# Patient Record
Sex: FEMALE | Race: BLACK OR AFRICAN AMERICAN | NOT HISPANIC OR LATINO | URBAN - METROPOLITAN AREA
[De-identification: names, ages, dates, MRNs, and addresses within clinical notes are randomized per-mention and may not be internally consistent; named-entity substitution may affect disease eponyms.]

---

## 2021-10-26 ENCOUNTER — OUTPATIENT (OUTPATIENT)
Dept: OUTPATIENT SERVICES | Facility: HOSPITAL | Age: 66
LOS: 1 days | End: 2021-10-26
Payer: MEDICARE

## 2021-10-26 VITALS
HEART RATE: 71 BPM | SYSTOLIC BLOOD PRESSURE: 122 MMHG | WEIGHT: 225.97 LBS | RESPIRATION RATE: 16 BRPM | DIASTOLIC BLOOD PRESSURE: 66 MMHG | OXYGEN SATURATION: 99 % | HEIGHT: 64.5 IN | TEMPERATURE: 97 F

## 2021-10-26 DIAGNOSIS — N84.0 POLYP OF CORPUS UTERI: Chronic | ICD-10-CM

## 2021-10-26 DIAGNOSIS — Z13.810 ENCOUNTER FOR SCREENING FOR UPPER GASTROINTESTINAL DISORDER: ICD-10-CM

## 2021-10-26 DIAGNOSIS — T78.40XA ALLERGY, UNSPECIFIED, INITIAL ENCOUNTER: ICD-10-CM

## 2021-10-26 DIAGNOSIS — I10 ESSENTIAL (PRIMARY) HYPERTENSION: ICD-10-CM

## 2021-10-26 LAB
ANION GAP SERPL CALC-SCNC: 10 MMOL/L — SIGNIFICANT CHANGE UP (ref 7–14)
BUN SERPL-MCNC: 17 MG/DL — SIGNIFICANT CHANGE UP (ref 7–23)
CALCIUM SERPL-MCNC: 10.3 MG/DL — SIGNIFICANT CHANGE UP (ref 8.4–10.5)
CHLORIDE SERPL-SCNC: 105 MMOL/L — SIGNIFICANT CHANGE UP (ref 98–107)
CO2 SERPL-SCNC: 24 MMOL/L — SIGNIFICANT CHANGE UP (ref 22–31)
CREAT SERPL-MCNC: 0.82 MG/DL — SIGNIFICANT CHANGE UP (ref 0.5–1.3)
GLUCOSE SERPL-MCNC: 107 MG/DL — HIGH (ref 70–99)
HCT VFR BLD CALC: 41.5 % — SIGNIFICANT CHANGE UP (ref 34.5–45)
HGB BLD-MCNC: 12.7 G/DL — SIGNIFICANT CHANGE UP (ref 11.5–15.5)
MCHC RBC-ENTMCNC: 27 PG — SIGNIFICANT CHANGE UP (ref 27–34)
MCHC RBC-ENTMCNC: 30.6 GM/DL — LOW (ref 32–36)
MCV RBC AUTO: 88.1 FL — SIGNIFICANT CHANGE UP (ref 80–100)
NRBC # BLD: 0 /100 WBCS — SIGNIFICANT CHANGE UP
NRBC # FLD: 0 K/UL — SIGNIFICANT CHANGE UP
PLATELET # BLD AUTO: 197 K/UL — SIGNIFICANT CHANGE UP (ref 150–400)
POTASSIUM SERPL-MCNC: 4.1 MMOL/L — SIGNIFICANT CHANGE UP (ref 3.5–5.3)
POTASSIUM SERPL-SCNC: 4.1 MMOL/L — SIGNIFICANT CHANGE UP (ref 3.5–5.3)
RBC # BLD: 4.71 M/UL — SIGNIFICANT CHANGE UP (ref 3.8–5.2)
RBC # FLD: 13.7 % — SIGNIFICANT CHANGE UP (ref 10.3–14.5)
SODIUM SERPL-SCNC: 139 MMOL/L — SIGNIFICANT CHANGE UP (ref 135–145)
WBC # BLD: 4.61 K/UL — SIGNIFICANT CHANGE UP (ref 3.8–10.5)
WBC # FLD AUTO: 4.61 K/UL — SIGNIFICANT CHANGE UP (ref 3.8–10.5)

## 2021-10-26 PROCEDURE — 93010 ELECTROCARDIOGRAM REPORT: CPT

## 2021-10-26 NOTE — H&P PST ADULT - NEGATIVE GASTROINTESTINAL SYMPTOMS
pre op dx: encounter screen upper gastrointestinal disorder/no nausea/no vomiting/no constipation/no abdominal pain

## 2021-10-26 NOTE — H&P PST ADULT - GASTROINTESTINAL DETAILS
pre op dx: encounter screen upper gastrointestinal disorder/soft/nontender/no distention/bowel sounds normal

## 2021-10-26 NOTE — H&P PST ADULT - PROBLEM SELECTOR PLAN 2
Patient instructed to take metoprolol with a sip of water on the morning of procedure.   Recent echo and stress results requested

## 2021-10-26 NOTE — H&P PST ADULT - HISTORY OF PRESENT ILLNESS
66 year old female with PMH of HTN  presents to Presurgical testing with diagnosis of encounter screen upper gastrointestinal disorder scheduled for esophagogastroduodenoscopy.

## 2021-10-26 NOTE — H&P PST ADULT - NSICDXFAMILYHX_GEN_ALL_CORE_FT
FAMILY HISTORY:  Mother  Still living? Unknown  Family history of colon cancer in mother, Age at diagnosis: Age Unknown

## 2021-10-26 NOTE — H&P PST ADULT - PROBLEM SELECTOR PLAN 1
Patient tentatively scheduled for  esophagogastroduodenoscopy on 10/29/21.  Pre-op instructions provided. Pt given verbal and written instructions with teach back on  pepcid. Pt verbalized understanding with return demonstration.   Covid testing scheduled prior to surgery as per patient.  NICA precautions,

## 2021-10-26 NOTE — H&P PST ADULT - NSANTHOSAYNRD_GEN_A_CORE
No. NICA screening performed.  STOP BANG Legend: 0-2 = LOW Risk; 3-4 = INTERMEDIATE Risk; 5-8 = HIGH Risk

## 2021-10-29 ENCOUNTER — RESULT REVIEW (OUTPATIENT)
Age: 66
End: 2021-10-29

## 2021-10-29 ENCOUNTER — OUTPATIENT (OUTPATIENT)
Dept: OUTPATIENT SERVICES | Facility: HOSPITAL | Age: 66
LOS: 1 days | Discharge: HOME | End: 2021-10-29
Payer: MEDICARE

## 2021-10-29 VITALS
OXYGEN SATURATION: 99 % | WEIGHT: 218.92 LBS | DIASTOLIC BLOOD PRESSURE: 69 MMHG | SYSTOLIC BLOOD PRESSURE: 123 MMHG | TEMPERATURE: 96 F | HEART RATE: 65 BPM | HEIGHT: 66 IN | RESPIRATION RATE: 16 BRPM

## 2021-10-29 VITALS
DIASTOLIC BLOOD PRESSURE: 55 MMHG | SYSTOLIC BLOOD PRESSURE: 11 MMHG | RESPIRATION RATE: 18 BRPM | HEART RATE: 54 BPM | OXYGEN SATURATION: 100 %

## 2021-10-29 DIAGNOSIS — N84.0 POLYP OF CORPUS UTERI: Chronic | ICD-10-CM

## 2021-10-29 DIAGNOSIS — Z13.810 ENCOUNTER FOR SCREENING FOR UPPER GASTROINTESTINAL DISORDER: ICD-10-CM

## 2021-10-29 PROCEDURE — 88305 TISSUE EXAM BY PATHOLOGIST: CPT | Mod: 26

## 2021-11-01 LAB — SURGICAL PATHOLOGY STUDY: SIGNIFICANT CHANGE UP

## 2024-09-03 PROBLEM — I10 ESSENTIAL (PRIMARY) HYPERTENSION: Chronic | Status: ACTIVE | Noted: 2021-10-26

## 2024-09-11 ENCOUNTER — APPOINTMENT (OUTPATIENT)
Dept: DERMATOLOGY | Facility: CLINIC | Age: 69
End: 2024-09-11
Payer: MEDICARE

## 2024-09-11 VITALS — HEIGHT: 65 IN | WEIGHT: 216 LBS | BODY MASS INDEX: 35.99 KG/M2

## 2024-09-11 DIAGNOSIS — R21 RASH AND OTHER NONSPECIFIC SKIN ERUPTION: ICD-10-CM

## 2024-09-11 PROCEDURE — 11104 PUNCH BX SKIN SINGLE LESION: CPT

## 2024-09-11 PROCEDURE — 99204 OFFICE O/P NEW MOD 45 MIN: CPT | Mod: 25

## 2024-09-11 RX ORDER — TRIAMCINOLONE ACETONIDE 0.25 MG/G
0.03 OINTMENT TOPICAL
Qty: 30 | Refills: 0 | Status: ACTIVE | COMMUNITY
Start: 2024-09-11 | End: 1900-01-01

## 2024-09-11 RX ORDER — BETAMETHASONE DIPROPIONATE 0.5 MG/G
0.05 OINTMENT TOPICAL
Qty: 1 | Refills: 0 | Status: ACTIVE | COMMUNITY
Start: 2024-09-11 | End: 1900-01-01

## 2024-09-11 RX ORDER — AMLODIPINE BESYLATE 5 MG/1
TABLET ORAL
Refills: 0 | Status: ACTIVE | COMMUNITY

## 2024-09-11 NOTE — HISTORY OF PRESENT ILLNESS
[FreeTextEntry1] : rash all over body [de-identified] : Rash all over x months had bx x2 in 3/2024 that showed (A) back - slight sponge (B) lower back - psoriasiform sponge, traumatized  pt has had ointments and injections (?ILK) wiuthout imp. currently no tx

## 2024-09-11 NOTE — PHYSICAL EXAM
[FreeTextEntry3] :  Gen:                        Well appearing, well nourished, NAD. Skin:                        Eccrine:                 Within normal limits   Hair:                         Scalp:                      Face:                       Neck:                           Back:                       UE:                          LE:                           Buttocks:                  Neuro:                     No focal deficits. Age appropriate. Psych:                     Appropriate affect.

## 2024-09-11 NOTE — ASSESSMENT
[FreeTextEntry1] : Rash "all over" x months - trunk, extremtiies, scalp, superior forehead. s/p ointments and injections without imp Bx x2 in 3/2024 that showed (A) back - slight sponge (B) lower back - psoriasiform sponge, traumatized  violaceous papulosquamous plaques - ddx favor lichen planus, also consider psoriasis SLE. pt denies any h/o CTD plan for punch biopsy today plan to check NANY  start beta dip to AA body scalp Proper use and SE meds disc TAC 0.025 AA face. do not apply to normal skin

## 2024-09-12 LAB — ANA SER IF-ACNC: NEGATIVE

## 2024-09-20 LAB — CORE LAB BIOPSY: NORMAL

## 2024-09-23 ENCOUNTER — NON-APPOINTMENT (OUTPATIENT)
Age: 69
End: 2024-09-23

## 2024-09-25 ENCOUNTER — APPOINTMENT (OUTPATIENT)
Dept: DERMATOLOGY | Facility: CLINIC | Age: 69
End: 2024-09-25
Payer: MEDICARE

## 2024-09-25 VITALS — BODY MASS INDEX: 35.99 KG/M2 | WEIGHT: 216 LBS | HEIGHT: 65 IN

## 2024-09-25 DIAGNOSIS — L40.9 PSORIASIS, UNSPECIFIED: ICD-10-CM

## 2024-09-25 PROCEDURE — 99214 OFFICE O/P EST MOD 30 MIN: CPT

## 2024-09-25 RX ORDER — CLOBETASOL PROPIONATE 0.05 G/ML
0.05 SPRAY TOPICAL
Qty: 1 | Refills: 2 | Status: ACTIVE | COMMUNITY
Start: 2024-09-25 | End: 1900-01-01

## 2024-09-25 NOTE — HISTORY OF PRESENT ILLNESS
[FreeTextEntry1] : psoriasis [de-identified] : Pt with rash x months. initial bx 3/2024 showed (A) slight sponge, (B) psoriasiform sponge rash had become more prominent since then, failed multiple topical treatment and ILK biopsy done by me LOV showed:  Final Diagnosis Left posterior thigh,   punch biopsy      - Consistent with psoriasis

## 2024-09-25 NOTE — ASSESSMENT
[FreeTextEntry1] : #Psorasis -confirmed on 9/2024 biopsy from L posterior thigh. suture no longer in place - fell out -dx, nature, mgmt options discussed. print out of bx report provided -pt has failed high, med and low potency topical steroids -pt has failed ILK in past -pt unable to come for phototherapy -pt declines injectable biologic d/t needle phobia  Plan -stop TAC - doesnt help -OK to cont cetaphil cream (states helps little) -start beta diprop for thick lesions on the body. Proper use and SE meds disc -clobetasol liquid - apply to affected areas of the sclap 1-2 times daily until improved -pt declines biologic due to needle fears not amenable at this time -otezla - starter pack then 30mg BID. Proper use and SE meds disc. normal Cr recently in HIE. pt denies h/o depression

## 2024-09-25 NOTE — PHYSICAL EXAM
[FreeTextEntry3] :  Gen:                        Well appearing, well nourished, NAD. Skin:                        Eccrine:                 Within normal limits   Hair:                                      Face:                       Neck:                           Back:                       UE:                          LE:                                     Neuro:                     No focal deficits. Age appropriate. Psych:                     Appropriate affect.

## 2024-11-27 ENCOUNTER — APPOINTMENT (OUTPATIENT)
Dept: DERMATOLOGY | Facility: CLINIC | Age: 69
End: 2024-11-27
Payer: MEDICARE

## 2024-11-27 VITALS — WEIGHT: 216 LBS | BODY MASS INDEX: 35.99 KG/M2 | HEIGHT: 65 IN

## 2024-11-27 DIAGNOSIS — L40.9 PSORIASIS, UNSPECIFIED: ICD-10-CM

## 2024-11-27 PROCEDURE — 99215 OFFICE O/P EST HI 40 MIN: CPT

## 2024-11-27 RX ORDER — GUSELKUMAB 100 MG/ML
100 INJECTION SUBCUTANEOUS
Qty: 1 | Refills: 5 | Status: ACTIVE | COMMUNITY
Start: 2024-11-27 | End: 1900-01-01

## 2024-11-27 RX ORDER — TRIAMCINOLONE ACETONIDE 1 MG/G
0.1 OINTMENT TOPICAL
Qty: 1 | Refills: 1 | Status: ACTIVE | COMMUNITY
Start: 2024-11-27 | End: 1900-01-01

## 2024-11-29 LAB
HBV CORE IGG+IGM SER QL: NONREACTIVE
HBV SURFACE AB SER QL: NONREACTIVE
HBV SURFACE AG SER QL: NONREACTIVE
HCV AB SER QL: NONREACTIVE
HCV S/CO RATIO: 0.08 S/CO

## 2024-12-03 LAB
M TB IFN-G BLD-IMP: POSITIVE
QUANTIFERON TB PLUS MITOGEN MINUS NIL: >10 IU/ML
QUANTIFERON TB PLUS NIL: 0.04 IU/ML
QUANTIFERON TB PLUS TB1 MINUS NIL: 1.72 IU/ML
QUANTIFERON TB PLUS TB2 MINUS NIL: 1.93 IU/ML

## 2024-12-30 ENCOUNTER — APPOINTMENT (OUTPATIENT)
Dept: DERMATOLOGY | Facility: CLINIC | Age: 69
End: 2024-12-30

## 2024-12-31 DIAGNOSIS — R76.12 NONSPECIFIC REACTION TO CELL MEDIATED IMMUNITY MEASUREMENT OF GAMMA INTERFERON ANTIGEN RESPONSE W/OUT ACTIVE TUBERCULOSIS: ICD-10-CM

## 2025-01-07 ENCOUNTER — APPOINTMENT (OUTPATIENT)
Dept: DERMATOLOGY | Facility: CLINIC | Age: 70
End: 2025-01-07
Payer: MEDICARE

## 2025-01-07 ENCOUNTER — NON-APPOINTMENT (OUTPATIENT)
Age: 70
End: 2025-01-07

## 2025-01-07 VITALS — HEIGHT: 65 IN | BODY MASS INDEX: 35.99 KG/M2 | WEIGHT: 216 LBS

## 2025-01-07 DIAGNOSIS — L40.9 PSORIASIS, UNSPECIFIED: ICD-10-CM

## 2025-01-07 PROCEDURE — 99215 OFFICE O/P EST HI 40 MIN: CPT

## 2025-01-07 RX ORDER — ACITRETIN 25 MG/1
25 CAPSULE ORAL DAILY
Qty: 30 | Refills: 2 | Status: DISCONTINUED | COMMUNITY
Start: 2025-01-02 | End: 2025-01-07

## 2025-01-07 RX ORDER — CLOBETASOL PROPIONATE 0.5 MG/G
0.05 OINTMENT TOPICAL
Qty: 1 | Refills: 2 | Status: ACTIVE | COMMUNITY
Start: 2025-01-07 | End: 1900-01-01

## 2025-01-21 ENCOUNTER — APPOINTMENT (OUTPATIENT)
Dept: RADIOLOGY | Facility: IMAGING CENTER | Age: 70
End: 2025-01-21
Payer: MEDICARE

## 2025-01-21 ENCOUNTER — OUTPATIENT (OUTPATIENT)
Dept: OUTPATIENT SERVICES | Facility: HOSPITAL | Age: 70
LOS: 1 days | End: 2025-01-21
Payer: MEDICARE

## 2025-01-21 DIAGNOSIS — Z00.8 ENCOUNTER FOR OTHER GENERAL EXAMINATION: ICD-10-CM

## 2025-01-21 PROCEDURE — 71046 X-RAY EXAM CHEST 2 VIEWS: CPT

## 2025-01-21 PROCEDURE — 71046 X-RAY EXAM CHEST 2 VIEWS: CPT | Mod: 26

## 2025-01-29 ENCOUNTER — APPOINTMENT (OUTPATIENT)
Dept: ULTRASOUND IMAGING | Facility: IMAGING CENTER | Age: 70
End: 2025-01-29
Payer: MEDICARE

## 2025-01-29 ENCOUNTER — OUTPATIENT (OUTPATIENT)
Dept: OUTPATIENT SERVICES | Facility: HOSPITAL | Age: 70
LOS: 1 days | End: 2025-01-29
Payer: MEDICARE

## 2025-01-29 DIAGNOSIS — N84.0 POLYP OF CORPUS UTERI: Chronic | ICD-10-CM

## 2025-01-29 DIAGNOSIS — Z00.8 ENCOUNTER FOR OTHER GENERAL EXAMINATION: ICD-10-CM

## 2025-01-29 PROCEDURE — 76536 US EXAM OF HEAD AND NECK: CPT

## 2025-01-29 PROCEDURE — 76536 US EXAM OF HEAD AND NECK: CPT | Mod: 26

## 2025-04-03 ENCOUNTER — APPOINTMENT (OUTPATIENT)
Dept: SURGERY | Facility: CLINIC | Age: 70
End: 2025-04-03
Payer: MEDICARE

## 2025-04-03 VITALS
DIASTOLIC BLOOD PRESSURE: 77 MMHG | WEIGHT: 221 LBS | SYSTOLIC BLOOD PRESSURE: 130 MMHG | HEIGHT: 66 IN | HEART RATE: 76 BPM | BODY MASS INDEX: 35.52 KG/M2

## 2025-04-03 DIAGNOSIS — E04.1 NONTOXIC SINGLE THYROID NODULE: ICD-10-CM

## 2025-04-03 DIAGNOSIS — Z80.0 FAMILY HISTORY OF MALIGNANT NEOPLASM OF DIGESTIVE ORGANS: ICD-10-CM

## 2025-04-03 DIAGNOSIS — E55.9 VITAMIN D DEFICIENCY, UNSPECIFIED: ICD-10-CM

## 2025-04-03 PROCEDURE — 36415 COLL VENOUS BLD VENIPUNCTURE: CPT

## 2025-04-03 PROCEDURE — 99204 OFFICE O/P NEW MOD 45 MIN: CPT | Mod: 25

## 2025-04-04 LAB
25(OH)D3 SERPL-MCNC: 12.6 NG/ML
CALCIUM SERPL-MCNC: 10.4 MG/DL
CALCIUM SERPL-MCNC: 10.4 MG/DL
PARATHYROID HORMONE INTACT: 61 PG/ML
T3 SERPL-MCNC: 98 NG/DL
T4 FREE SERPL-MCNC: 1.1 NG/DL
THYROGLOB AB SERPL-ACNC: 15.1 IU/ML
THYROPEROXIDASE AB SERPL IA-ACNC: 19.5 IU/ML
TSH SERPL-ACNC: 1.23 UIU/ML

## 2025-04-09 DIAGNOSIS — E83.52 HYPERCALCEMIA: ICD-10-CM

## 2025-04-09 LAB
CAU: 25 MG/DL
CREAT 24H UR-MCNC: 1.6 G/24 H
CREAT ?TM UR-MCNC: 142 MG/DL
PROT ?TM UR-MCNC: 24 HR
SPECIMEN VOL 24H UR: 1100 ML
SPECIMEN VOL 24H UR: 275 MG/24 H

## 2025-05-21 ENCOUNTER — OUTPATIENT (OUTPATIENT)
Dept: OUTPATIENT SERVICES | Facility: HOSPITAL | Age: 70
LOS: 1 days | End: 2025-05-21
Payer: MEDICARE

## 2025-05-21 ENCOUNTER — APPOINTMENT (OUTPATIENT)
Dept: MRI IMAGING | Facility: IMAGING CENTER | Age: 70
End: 2025-05-21

## 2025-05-21 DIAGNOSIS — N84.0 POLYP OF CORPUS UTERI: Chronic | ICD-10-CM

## 2025-05-21 DIAGNOSIS — Z00.8 ENCOUNTER FOR OTHER GENERAL EXAMINATION: ICD-10-CM

## 2025-05-21 PROCEDURE — 70543 MRI ORBT/FAC/NCK W/O &W/DYE: CPT

## 2025-05-21 PROCEDURE — 70543 MRI ORBT/FAC/NCK W/O &W/DYE: CPT | Mod: 26

## 2025-05-21 PROCEDURE — A9585: CPT

## 2025-06-11 ENCOUNTER — APPOINTMENT (OUTPATIENT)
Dept: SURGICAL ONCOLOGY | Facility: CLINIC | Age: 70
End: 2025-06-11
Payer: MEDICARE

## 2025-06-11 VITALS
HEART RATE: 80 BPM | WEIGHT: 221 LBS | OXYGEN SATURATION: 97 % | BODY MASS INDEX: 35.52 KG/M2 | DIASTOLIC BLOOD PRESSURE: 80 MMHG | SYSTOLIC BLOOD PRESSURE: 150 MMHG | HEIGHT: 66 IN

## 2025-06-11 PROCEDURE — 99215 OFFICE O/P EST HI 40 MIN: CPT

## 2025-06-16 ENCOUNTER — OUTPATIENT (OUTPATIENT)
Dept: OUTPATIENT SERVICES | Facility: HOSPITAL | Age: 70
LOS: 1 days | End: 2025-06-16
Payer: MEDICARE

## 2025-06-16 ENCOUNTER — RESULT REVIEW (OUTPATIENT)
Age: 70
End: 2025-06-16

## 2025-06-16 DIAGNOSIS — N84.0 POLYP OF CORPUS UTERI: Chronic | ICD-10-CM

## 2025-06-16 DIAGNOSIS — C80.1 MALIGNANT (PRIMARY) NEOPLASM, UNSPECIFIED: ICD-10-CM

## 2025-06-16 PROCEDURE — 88321 CONSLTJ&REPRT SLD PREP ELSWR: CPT

## 2025-06-17 LAB — SURGICAL PATHOLOGY STUDY: SIGNIFICANT CHANGE UP

## 2025-06-23 ENCOUNTER — APPOINTMENT (OUTPATIENT)
Dept: MAMMOGRAPHY | Facility: IMAGING CENTER | Age: 70
End: 2025-06-23
Payer: MEDICARE

## 2025-06-23 ENCOUNTER — RESULT REVIEW (OUTPATIENT)
Age: 70
End: 2025-06-23

## 2025-06-23 ENCOUNTER — OUTPATIENT (OUTPATIENT)
Dept: OUTPATIENT SERVICES | Facility: HOSPITAL | Age: 70
LOS: 1 days | End: 2025-06-23
Payer: MEDICARE

## 2025-06-23 ENCOUNTER — OUTPATIENT (OUTPATIENT)
Dept: OUTPATIENT SERVICES | Facility: HOSPITAL | Age: 70
LOS: 1 days | End: 2025-06-23

## 2025-06-23 VITALS
DIASTOLIC BLOOD PRESSURE: 83 MMHG | WEIGHT: 216.05 LBS | TEMPERATURE: 98 F | HEIGHT: 66 IN | HEART RATE: 82 BPM | OXYGEN SATURATION: 97 % | RESPIRATION RATE: 16 BRPM | SYSTOLIC BLOOD PRESSURE: 139 MMHG

## 2025-06-23 DIAGNOSIS — K21.9 GASTRO-ESOPHAGEAL REFLUX DISEASE WITHOUT ESOPHAGITIS: ICD-10-CM

## 2025-06-23 DIAGNOSIS — I10 ESSENTIAL (PRIMARY) HYPERTENSION: ICD-10-CM

## 2025-06-23 DIAGNOSIS — D05.11 INTRADUCTAL CARCINOMA IN SITU OF RIGHT BREAST: ICD-10-CM

## 2025-06-23 DIAGNOSIS — Z00.8 ENCOUNTER FOR OTHER GENERAL EXAMINATION: ICD-10-CM

## 2025-06-23 DIAGNOSIS — Z98.890 OTHER SPECIFIED POSTPROCEDURAL STATES: Chronic | ICD-10-CM

## 2025-06-23 DIAGNOSIS — N84.0 POLYP OF CORPUS UTERI: Chronic | ICD-10-CM

## 2025-06-23 PROCEDURE — 19281 PERQ DEVICE BREAST 1ST IMAG: CPT

## 2025-06-23 PROCEDURE — 19281 PERQ DEVICE BREAST 1ST IMAG: CPT | Mod: RT

## 2025-06-23 PROCEDURE — A4648: CPT

## 2025-06-23 NOTE — H&P PST ADULT - NSICDXPASTMEDICALHX_GEN_ALL_CORE_FT
PAST MEDICAL HISTORY:  Fibroid uterus     GERD (gastroesophageal reflux disease)     H/O psoriasis     H/O thyroid nodule     HTN (hypertension)     Intraductal carcinoma in situ of right breast

## 2025-06-23 NOTE — H&P PST ADULT - BP NONINVASIVE SYSTOLIC (MM HG)
139 Suturegard Body: The suture ends were repeatedly re-tightened and re-clamped to achieve the desired tissue expansion.

## 2025-06-23 NOTE — H&P PST ADULT - MUSCULOSKELETAL COMMENTS
right shoulder pain, was recently dx with hypercalcemia and is followed up by Dr Reilly, Will have parathyroidectomy once recovered from breast surgery

## 2025-06-23 NOTE — H&P PST ADULT - PROBLEM SELECTOR PLAN 1
Scheduled for Right breast lumpectomy- Magseed localization on 6/30/35.  Pre-op instructions provided. Pt given verbal and written instructions with teach back on chlorhexidine shampoo. Pt verbalized understanding with return demonstration.

## 2025-06-23 NOTE — H&P PST ADULT - HISTORY OF PRESENT ILLNESS
69 yr old female with h/o HTN, Hypercalcemia and preop dx of intraductal carcinoma insitu of right breast presents to have PST eval for Right breast lumpectomy - Magseed localization.    She had an MRI guided biopsy for non-mass enhancement in the right breast 6:00 on MRI however asymmetry on mammogram was read as right outer breast but the 2 areas were felt to correlate on post-biopsy mammogram.    Patient also was recently dx with Hypercalcemia and was seen by Dr Reilly, had MRI with contrast and will have Parathyroidectomy after recovering from breast surgery.

## 2025-06-30 ENCOUNTER — RESULT REVIEW (OUTPATIENT)
Age: 70
End: 2025-06-30

## 2025-06-30 ENCOUNTER — OUTPATIENT (OUTPATIENT)
Dept: OUTPATIENT SERVICES | Facility: HOSPITAL | Age: 70
LOS: 1 days | End: 2025-06-30
Payer: COMMERCIAL

## 2025-06-30 ENCOUNTER — OUTPATIENT (OUTPATIENT)
Dept: OUTPATIENT SERVICES | Facility: HOSPITAL | Age: 70
LOS: 1 days | End: 2025-06-30
Payer: MEDICARE

## 2025-06-30 ENCOUNTER — APPOINTMENT (OUTPATIENT)
Dept: SURGICAL ONCOLOGY | Facility: AMBULATORY SURGERY CENTER | Age: 70
End: 2025-06-30

## 2025-06-30 ENCOUNTER — APPOINTMENT (OUTPATIENT)
Dept: MAMMOGRAPHY | Facility: IMAGING CENTER | Age: 70
End: 2025-06-30
Payer: MEDICARE

## 2025-06-30 ENCOUNTER — TRANSCRIPTION ENCOUNTER (OUTPATIENT)
Age: 70
End: 2025-06-30

## 2025-06-30 VITALS
DIASTOLIC BLOOD PRESSURE: 83 MMHG | WEIGHT: 216.05 LBS | TEMPERATURE: 98 F | HEART RATE: 77 BPM | OXYGEN SATURATION: 99 % | RESPIRATION RATE: 16 BRPM | HEIGHT: 66 IN | SYSTOLIC BLOOD PRESSURE: 152 MMHG

## 2025-06-30 VITALS
HEART RATE: 73 BPM | DIASTOLIC BLOOD PRESSURE: 66 MMHG | SYSTOLIC BLOOD PRESSURE: 123 MMHG | OXYGEN SATURATION: 100 % | RESPIRATION RATE: 18 BRPM | TEMPERATURE: 97 F

## 2025-06-30 DIAGNOSIS — N84.0 POLYP OF CORPUS UTERI: Chronic | ICD-10-CM

## 2025-06-30 DIAGNOSIS — D05.10 INTRADUCTAL CARCINOMA IN SITU OF UNSPECIFIED BREAST: ICD-10-CM

## 2025-06-30 DIAGNOSIS — Z00.8 ENCOUNTER FOR OTHER GENERAL EXAMINATION: ICD-10-CM

## 2025-06-30 DIAGNOSIS — D05.11 INTRADUCTAL CARCINOMA IN SITU OF RIGHT BREAST: ICD-10-CM

## 2025-06-30 DIAGNOSIS — Z98.890 OTHER SPECIFIED POSTPROCEDURAL STATES: Chronic | ICD-10-CM

## 2025-06-30 PROCEDURE — 88307 TISSUE EXAM BY PATHOLOGIST: CPT | Mod: 26

## 2025-06-30 PROCEDURE — 14001 TIS TRNFR TRUNK 10.1-30SQCM: CPT

## 2025-06-30 PROCEDURE — 19301 PARTIAL MASTECTOMY: CPT | Mod: RT

## 2025-06-30 PROCEDURE — 76098 X-RAY EXAM SURGICAL SPECIMEN: CPT | Mod: 26

## 2025-06-30 PROCEDURE — 76098 X-RAY EXAM SURGICAL SPECIMEN: CPT

## 2025-06-30 PROCEDURE — 88305 TISSUE EXAM BY PATHOLOGIST: CPT | Mod: 26

## 2025-06-30 DEVICE — AGENT HEMOSTATIC HEMOBLAST 1.65G 10CM: Type: IMPLANTABLE DEVICE | Site: RIGHT | Status: FUNCTIONAL

## 2025-06-30 DEVICE — SURGICEL FIBRILLAR 2 X 4": Type: IMPLANTABLE DEVICE | Site: RIGHT | Status: FUNCTIONAL

## 2025-06-30 RX ORDER — AMLODIPINE BESYLATE 10 MG/1
1 TABLET ORAL
Refills: 0 | DISCHARGE

## 2025-06-30 RX ORDER — ASPIRIN 325 MG
1 TABLET ORAL
Refills: 0 | DISCHARGE

## 2025-06-30 NOTE — ASU DISCHARGE PLAN (ADULT/PEDIATRIC) - ASU DC SPECIAL INSTRUCTIONSFT
PAIN: You may continue to take Acetaminophen (Tylenol) over the counter for pain every 6 hours. We recommend taking the medication around the clock for the first few days at home after surgery. Then you can start taking them only as needed for pain.  WOUND CARE:  You should allow warm soapy water to run down the wound in the shower. You should not need to scrub the area. You do not have any stitches that need to be removed. If you have glue or steri-strips on your wound, it will fall off on its own. You should wear your surgical bra at all times, it may be removed to shower.  BATHING: Please do not soak or submerge the wound in water (bath, swimming) for 4 weeks after your surgery.  ACTIVITY: No heavy lifting, straining, or vigorous activity until your follow-up appointment in 2 weeks.   NOTIFY US IF: You have any bleeding that does not stop, any pus draining from your wound(s), any fever (over 101 F) or chills, persistent nausea/vomiting, persistent diarrhea, or if your pain is not controlled on their discharge pain medications.  MEDICATION: You may restart your Aspirin on Wednesday 7/2. Do not take Ibuprofen after you have begun taking Aspirin.  FOLLOW-UP: Please call the office and make an appointment to follow up with Dr. Parra in 2 weeks.  Please follow up with your primary care physician in 1-2 weeks regarding your hospitalization.

## 2025-06-30 NOTE — ASU DISCHARGE PLAN (ADULT/PEDIATRIC) - NO SPORTS/GYM DURATION
Patient alert and oriented but sleepy. Patient had CT abdomen done per Dr. Alina Singh will follow outpatient. BG monitored every 2 hours. BG above 100 throughout shift thus far. Will continue to monitor. Problem: Discharge Planning  Goal: Discharge to home or other facility with appropriate resources  Outcome: Progressing  Flowsheets (Taken 8/19/2023 1522)  Discharge to home or other facility with appropriate resources:   Identify barriers to discharge with patient and caregiver   Arrange for interpreters to assist at discharge as needed   Refer to discharge planning if patient needs post-hospital services based on physician order or complex needs related to functional status, cognitive ability or social support system   Identify discharge learning needs (meds, wound care, etc)   Arrange for needed discharge resources and transportation as appropriate     Problem: Skin/Tissue Integrity  Goal: Absence of new skin breakdown  Description: 1. Monitor for areas of redness and/or skin breakdown  2. Assess vascular access sites hourly  3. Every 4-6 hours minimum:  Change oxygen saturation probe site  4. Every 4-6 hours:  If on nasal continuous positive airway pressure, respiratory therapy assess nares and determine need for appliance change or resting period. Outcome: Progressing     Problem: Safety - Adult  Goal: Free from fall injury  Outcome: Progressing  Note: Siderails up x 2  Hourly rounding. Call light in reach. Instructed to call for assist before attempting out of bed. Remains free from falls and accidental injury at this time. Floor free from obstacles, and bed is locked and in lowest position. Adequate lighting provided. Bed alarm on. Fall sticker on wristband.  Fall Sign posted in doorway
Problem: Discharge Planning  Goal: Discharge to home or other facility with appropriate resources  8/20/2023 0138 by Aleyda Melgar RN  Outcome: Progressing    Problem: Skin/Tissue Integrity  Goal: Absence of new skin breakdown  Description: 1.   Monitor for areas of redness and/or skin breakdown  8/20/2023 0138 by Aleyda Melgar RN  Outcome: Progressing     Problem: Safety - Adult  Goal: Free from fall injury  8/20/2023 0138 by Aleyda Melgar RN  Outcome: Progressing     Problem: Nutrition Deficit:  Goal: Optimize nutritional status  Outcome: Progressing        Problem: Metabolic/Fluid and Electrolytes - Adult  Goal: Electrolytes maintained within normal limits  Outcome: Progressing     Problem: Infection - Adult  Goal: Absence of infection at discharge  Outcome: Progressing     Problem: Musculoskeletal - Adult  Goal: Return mobility to safest level of function  Outcome: Progressing
Problem: Discharge Planning  Goal: Discharge to home or other facility with appropriate resources  Outcome: Progressing     Problem: Skin/Tissue Integrity  Goal: Absence of new skin breakdown  Description: 1. Monitor for areas of redness and/or skin breakdown  2. Assess vascular access sites hourly  3. Every 4-6 hours minimum:  Change oxygen saturation probe site  4. Every 4-6 hours:  If on nasal continuous positive airway pressure, respiratory therapy assess nares and determine need for appliance change or resting period.   8/18/2023 2320 by Shilo Blackwell RN  Outcome: Progressing  8/18/2023 1841 by Andrés Royal RN  Outcome: Progressing     Problem: ABCDS Injury Assessment  Goal: Absence of physical injury  8/18/2023 2320 by Shilo Blackwell RN  Outcome: Progressing  8/18/2023 1841 by Andrés Royal RN  Outcome: Progressing     Problem: Safety - Adult  Goal: Free from fall injury  8/18/2023 2320 by Sihlo Blackwell RN  Outcome: Progressing  8/18/2023 1841 by Andrés Royal RN  Outcome: Progressing
Problem: Discharge Planning  Goal: Discharge to home or other facility with appropriate resources  Outcome: Progressing  Note: Discharge teaching and instructions for diagnosis/procedure explained with patient using teachback method. Patient voiced understanding regarding prescriptions, follow up appointments, and care of self at home. Problem: Skin/Tissue Integrity  Goal: Absence of new skin breakdown  Description: 1. Monitor for areas of redness and/or skin breakdown  2. Assess vascular access sites hourly  3. Every 4-6 hours minimum:  Change oxygen saturation probe site  4. Every 4-6 hours:  If on nasal continuous positive airway pressure, respiratory therapy assess nares and determine need for appliance change or resting period. Outcome: Progressing  Note: Continuing to monitor for skin integrity risks. Patient independent with turning/repositioning. Turning/repositioning encouraged at least once every 2 hrs, and prn basis. Hygiene care being completed independently per patient; assistance provided when deemed necessary. Problem: ABCDS Injury Assessment  Goal: Absence of physical injury  Outcome: Progressing  Note: Non-skid socks in place, up with assistance, bed in lowest position, bed exit & alarm as needed, provide toileting every 2 hours an d as needed. Problem: Safety - Adult  Goal: Free from fall injury  Outcome: Progressing  Note: Pt fall risk, fall band present, falling star, safety alarm activated and in use as needed. Hourly rounding performed. Pt encouraged to use call light. See Ange Walton fall risk assessment. Problem: Nutrition Deficit:  Goal: Optimize nutritional status  Outcome: Progressing  Note: Review diet daily and as needed with pt. Provide supplement nutrition as ordered by physician & educate pt. Review nutritional guidelines with pt.       Problem: Skin/Tissue Integrity - Adult  Goal: Skin integrity remains intact  Outcome: Progressing  Note: Continuing to monitor
Problem: Discharge Planning  Goal: Discharge to home or other facility with appropriate resources  Outcome: Progressing  Note: Discharge teaching and instructions for diagnosis/procedure explained with patient using teachback method. Patient voiced understanding regarding prescriptions, follow up appointments, and care of self at home. Problem: Skin/Tissue Integrity  Goal: Absence of new skin breakdown  Description: 1. Monitor for areas of redness and/or skin breakdown  2. Assess vascular access sites hourly  3. Every 4-6 hours minimum:  Change oxygen saturation probe site  4. Every 4-6 hours:  If on nasal continuous positive airway pressure, respiratory therapy assess nares and determine need for appliance change or resting period. Outcome: Progressing  Note: Continuing to monitor for skin integrity risks. Patient independent with turning/repositioning. Turning/repositioning encouraged at least once every 2 hrs, and prn basis. Hygiene care being completed independently per patient; assistance provided when deemed necessary. Problem: ABCDS Injury Assessment  Goal: Absence of physical injury  Outcome: Progressing  Note: Non-skid socks in place, up with assistance, bed in lowest position, bed exit & alarm as needed, provide toileting every 2 hours an d as needed. Problem: Safety - Adult  Goal: Free from fall injury  Outcome: Progressing  Note: Pt fall risk, fall band present, falling star, safety alarm activated and in use as needed. Hourly rounding performed. Pt encouraged to use call light. See Yudith Reilly fall risk assessment. Problem: Nutrition Deficit:  Goal: Optimize nutritional status  Outcome: Progressing  Note: Review diet daily and as needed with pt. Provide supplement nutrition as ordered by physician & educate pt. Review nutritional guidelines with pt.       Problem: Skin/Tissue Integrity - Adult  Goal: Skin integrity remains intact  Outcome: Progressing  Note: Continuing to monitor
Problem: Skin/Tissue Integrity  Goal: Absence of new skin breakdown  Description: 1. Monitor for areas of redness and/or skin breakdown  2. Assess vascular access sites hourly  3. Every 4-6 hours minimum:  Change oxygen saturation probe site  4. Every 4-6 hours:  If on nasal continuous positive airway pressure, respiratory therapy assess nares and determine need for appliance change or resting period.   Outcome: Progressing     Problem: ABCDS Injury Assessment  Goal: Absence of physical injury  Outcome: Progressing     Problem: Safety - Adult  Goal: Free from fall injury  Outcome: Progressing
Pt remains safe and free from falls thus far in shift. Plan for midline today for home IV antibiotics. [place] in place. ACHS, no coverage needed. Discharge planning, in progress. Bed locked and lowest position. Call light in reach. Bed alarm on for safety. Problem: Discharge Planning  Goal: Discharge to home or other facility with appropriate resources  8/22/2023 1134 by Mirna Truong RN  Outcome: Progressing     Problem: Skin/Tissue Integrity  Goal: Absence of new skin breakdown  Description: 1. Monitor for areas of redness and/or skin breakdown  2. Assess vascular access sites hourly  3. Every 4-6 hours minimum:  Change oxygen saturation probe site  4. Every 4-6 hours:  If on nasal continuous positive airway pressure, respiratory therapy assess nares and determine need for appliance change or resting period.   8/22/2023 1134 by Mirna Truong RN  Outcome: Progressing     Problem: ABCDS Injury Assessment  Goal: Absence of physical injury  8/22/2023 1134 by Mirna Truong RN  Outcome: Progressing     Problem: Safety - Adult  Goal: Free from fall injury  8/22/2023 1134 by Mirna Truong RN  Outcome: Progressing     Problem: Nutrition Deficit:  Goal: Optimize nutritional status  8/22/2023 1134 by Mirna Truong RN  Outcome: Progressing     Problem: Skin/Tissue Integrity - Adult  Goal: Skin integrity remains intact  8/22/2023 1134 by Mirna Truong RN  Outcome: Progressing     Problem: Infection - Adult  Goal: Absence of infection at discharge  8/22/2023 1134 by Mirna Truong RN  Outcome: Progressing     Problem: Metabolic/Fluid and Electrolytes - Adult  Goal: Electrolytes maintained within normal limits  8/22/2023 1134 by Mirna Truong RN  Outcome: Progressing
4 weeks

## 2025-06-30 NOTE — ASU DISCHARGE PLAN (ADULT/PEDIATRIC) - CARE PROVIDER_API CALL
Next Appt:  05/04/2022 - Delmi Piedra MD   Jay Parra  Surgery (General Surgery)  [address] [place] [id number]  Phone: [phone]  Fax: [phone]  Follow Up Time: 2 weeks

## 2025-06-30 NOTE — ASU PREOPERATIVE ASSESSMENT, ADULT ([facility]ARK ONLY) - FALL HARM RISK - UNIVERSAL INTERVENTIONS
Bed in lowest position, wheels locked, appropriate side rails in place/Call bell, personal items and telephone in reach/Instruct patient to call for assistance before getting out of bed or chair/Non-slip footwear when patient is out of bed/[place] to call system/Physically safe environment - no spills, clutter or unnecessary equipment/Purposeful Proactive Rounding/Room/bathroom lighting operational, light cord in reach

## 2025-06-30 NOTE — ASU DISCHARGE PLAN (ADULT/PEDIATRIC) - FINANCIAL ASSISTANCE
[facility] provides services at a reduced cost to those who are determined to be eligible through [facility]’s financial assistance program. Information regarding [facility]’s financial assistance program can be found by going to [email or web address] or by calling [phone].

## 2025-06-30 NOTE — ASU DISCHARGE PLAN (ADULT/PEDIATRIC) - NURSING INSTRUCTIONS
You were given IV Tylenol (1000mg) for pain management in the Operating Room.  Please do NOT take any additional Tylenol/Acetaminophen products (Percocet, Vicodin, Excedrin) for the next 6-8 hours (after  2:30PM TODAY 6/30/2025). Please do not take Tylenol AND Percocet/Vicodin/Excedrin as narcotic prescription already contains Tylenol.

## 2025-06-30 NOTE — BRIEF OPERATIVE NOTE - OPERATION/FINDINGS
Right breast lumpectomy, mag seed and clip identified within specimen on radiography. Additional margins sent.

## 2025-07-01 PROBLEM — Z87.2 PERSONAL HISTORY OF DISEASES OF THE SKIN AND SUBCUTANEOUS TISSUE: Chronic | Status: ACTIVE | Noted: 2025-06-23

## 2025-07-01 PROBLEM — K21.9 GASTRO-ESOPHAGEAL REFLUX DISEASE WITHOUT ESOPHAGITIS: Chronic | Status: ACTIVE | Noted: 2025-06-23

## 2025-07-01 PROBLEM — Z86.39 PERSONAL HISTORY OF OTHER ENDOCRINE, NUTRITIONAL AND METABOLIC DISEASE: Chronic | Status: ACTIVE | Noted: 2025-06-23

## 2025-07-01 PROBLEM — D25.9 LEIOMYOMA OF UTERUS, UNSPECIFIED: Chronic | Status: ACTIVE | Noted: 2025-06-23

## 2025-07-07 LAB — SURGICAL PATHOLOGY STUDY: SIGNIFICANT CHANGE UP

## 2025-07-11 ENCOUNTER — APPOINTMENT (OUTPATIENT)
Dept: SURGICAL ONCOLOGY | Facility: CLINIC | Age: 70
End: 2025-07-11

## 2025-07-11 ENCOUNTER — NON-APPOINTMENT (OUTPATIENT)
Age: 70
End: 2025-07-11

## 2025-07-11 VITALS
HEIGHT: 66 IN | WEIGHT: 217 LBS | BODY MASS INDEX: 34.87 KG/M2 | OXYGEN SATURATION: 98 % | HEART RATE: 83 BPM | RESPIRATION RATE: 17 BRPM | DIASTOLIC BLOOD PRESSURE: 73 MMHG | SYSTOLIC BLOOD PRESSURE: 149 MMHG

## 2025-07-11 PROCEDURE — 99024 POSTOP FOLLOW-UP VISIT: CPT

## 2025-07-16 ENCOUNTER — NON-APPOINTMENT (OUTPATIENT)
Age: 70
End: 2025-07-16

## 2025-07-16 ENCOUNTER — APPOINTMENT (OUTPATIENT)
Dept: SURGICAL ONCOLOGY | Facility: CLINIC | Age: 70
End: 2025-07-16

## 2025-07-16 VITALS
HEIGHT: 66 IN | OXYGEN SATURATION: 99 % | BODY MASS INDEX: 34.55 KG/M2 | RESPIRATION RATE: 17 BRPM | WEIGHT: 215 LBS | HEART RATE: 89 BPM | SYSTOLIC BLOOD PRESSURE: 123 MMHG | DIASTOLIC BLOOD PRESSURE: 74 MMHG

## 2025-07-16 PROCEDURE — 99024 POSTOP FOLLOW-UP VISIT: CPT

## 2025-07-16 PROCEDURE — 10140 I&D HMTMA SEROMA/FLUID COLLJ: CPT | Mod: 78

## 2025-07-23 ENCOUNTER — APPOINTMENT (OUTPATIENT)
Dept: SURGICAL ONCOLOGY | Facility: CLINIC | Age: 70
End: 2025-07-23

## 2025-07-23 VITALS
DIASTOLIC BLOOD PRESSURE: 80 MMHG | HEIGHT: 66 IN | BODY MASS INDEX: 34.55 KG/M2 | HEART RATE: 70 BPM | OXYGEN SATURATION: 97 % | SYSTOLIC BLOOD PRESSURE: 135 MMHG | WEIGHT: 215 LBS

## 2025-07-30 ENCOUNTER — NON-APPOINTMENT (OUTPATIENT)
Age: 70
End: 2025-07-30

## 2025-07-30 ENCOUNTER — APPOINTMENT (OUTPATIENT)
Dept: SURGICAL ONCOLOGY | Facility: CLINIC | Age: 70
End: 2025-07-30
Payer: MEDICARE

## 2025-07-30 VITALS
HEART RATE: 74 BPM | OXYGEN SATURATION: 98 % | SYSTOLIC BLOOD PRESSURE: 125 MMHG | WEIGHT: 221 LBS | BODY MASS INDEX: 35.52 KG/M2 | HEIGHT: 66 IN | RESPIRATION RATE: 17 BRPM | DIASTOLIC BLOOD PRESSURE: 80 MMHG

## 2025-07-30 DIAGNOSIS — D05.11 INTRADUCTAL CARCINOMA IN SITU OF RIGHT BREAST: ICD-10-CM

## 2025-07-30 PROCEDURE — 99024 POSTOP FOLLOW-UP VISIT: CPT

## 2025-08-13 ENCOUNTER — APPOINTMENT (OUTPATIENT)
Dept: SURGICAL ONCOLOGY | Facility: CLINIC | Age: 70
End: 2025-08-13

## 2025-08-13 ENCOUNTER — NON-APPOINTMENT (OUTPATIENT)
Age: 70
End: 2025-08-13

## 2025-08-13 VITALS
HEIGHT: 66 IN | BODY MASS INDEX: 35.52 KG/M2 | HEART RATE: 91 BPM | DIASTOLIC BLOOD PRESSURE: 70 MMHG | SYSTOLIC BLOOD PRESSURE: 140 MMHG | OXYGEN SATURATION: 98 % | WEIGHT: 221 LBS

## 2025-08-13 PROCEDURE — 99024 POSTOP FOLLOW-UP VISIT: CPT

## 2025-09-02 ENCOUNTER — APPOINTMENT (OUTPATIENT)
Dept: RADIATION ONCOLOGY | Facility: CLINIC | Age: 70
End: 2025-09-02
Payer: MEDICARE

## 2025-09-02 ENCOUNTER — NON-APPOINTMENT (OUTPATIENT)
Age: 70
End: 2025-09-02

## 2025-09-02 VITALS
BODY MASS INDEX: 35.51 KG/M2 | HEART RATE: 80 BPM | DIASTOLIC BLOOD PRESSURE: 84 MMHG | SYSTOLIC BLOOD PRESSURE: 150 MMHG | TEMPERATURE: 96.98 F | RESPIRATION RATE: 17 BRPM | WEIGHT: 220 LBS | OXYGEN SATURATION: 99 %

## 2025-09-02 DIAGNOSIS — Z86.69 PERSONAL HISTORY OF OTHER DISEASES OF THE NERVOUS SYSTEM AND SENSE ORGANS: ICD-10-CM

## 2025-09-02 DIAGNOSIS — Z87.39 PERSONAL HISTORY OF OTHER DISEASES OF THE MUSCULOSKELETAL SYSTEM AND CONNECTIVE TISSUE: ICD-10-CM

## 2025-09-02 DIAGNOSIS — Z23 ENCOUNTER FOR IMMUNIZATION: ICD-10-CM

## 2025-09-02 PROCEDURE — 99205 OFFICE O/P NEW HI 60 MIN: CPT | Mod: GC

## 2025-09-02 RX ORDER — ACETAMINOPHEN 325 MG/1
TABLET ORAL
Refills: 0 | Status: ACTIVE | COMMUNITY

## 2025-09-02 RX ORDER — AMLODIPINE BESYLATE AND BENAZEPRIL HYDROCHLORIDE 2.5; 1 MG/1; MG/1
2.5-1 CAPSULE ORAL
Refills: 0 | Status: ACTIVE | COMMUNITY

## 2025-09-02 RX ORDER — FAMOTIDINE 10 MG/1
TABLET, FILM COATED ORAL
Refills: 0 | Status: ACTIVE | COMMUNITY

## 2025-09-05 ENCOUNTER — APPOINTMENT (OUTPATIENT)
Dept: HEMATOLOGY ONCOLOGY | Facility: CLINIC | Age: 70
End: 2025-09-05
Payer: MEDICARE

## 2025-09-05 VITALS
BODY MASS INDEX: 39 KG/M2 | HEART RATE: 76 BPM | WEIGHT: 222.87 LBS | HEIGHT: 63.39 IN | OXYGEN SATURATION: 98 % | RESPIRATION RATE: 16 BRPM | TEMPERATURE: 97.2 F | SYSTOLIC BLOOD PRESSURE: 130 MMHG | DIASTOLIC BLOOD PRESSURE: 73 MMHG

## 2025-09-05 DIAGNOSIS — D05.11 INTRADUCTAL CARCINOMA IN SITU OF RIGHT BREAST: ICD-10-CM

## 2025-09-05 PROCEDURE — 99205 OFFICE O/P NEW HI 60 MIN: CPT

## 2025-09-08 ENCOUNTER — NON-APPOINTMENT (OUTPATIENT)
Age: 70
End: 2025-09-08

## 2025-09-09 ENCOUNTER — NON-APPOINTMENT (OUTPATIENT)
Age: 70
End: 2025-09-09

## 2025-09-09 DIAGNOSIS — E21.3 HYPERPARATHYROIDISM, UNSPECIFIED: ICD-10-CM

## 2025-09-18 LAB
ALBUMIN SERPL ELPH-MCNC: 4 G/DL
ALP BLD-CCNC: 108 U/L
ALT SERPL-CCNC: 14 U/L
ANION GAP SERPL CALC-SCNC: 10 MMOL/L
AST SERPL-CCNC: 22 U/L
BILIRUB SERPL-MCNC: 0.5 MG/DL
BUN SERPL-MCNC: 10 MG/DL
CA-I SERPL-SCNC: 1.31 MMOL/L
CALCIUM SERPL-MCNC: 10.3 MG/DL
CHLORIDE SERPL-SCNC: 108 MMOL/L
CO2 SERPL-SCNC: 23 MMOL/L
CREAT SERPL-MCNC: 0.72 MG/DL
EGFRCR SERPLBLD CKD-EPI 2021: 89 ML/MIN/1.73M2
GLUCOSE SERPL-MCNC: 74 MG/DL
POTASSIUM SERPL-SCNC: 4.7 MMOL/L
PROT SERPL-MCNC: 6.9 G/DL
SODIUM SERPL-SCNC: 141 MMOL/L

## 2025-09-20 ENCOUNTER — APPOINTMENT (OUTPATIENT)
Dept: NUCLEAR MEDICINE | Facility: IMAGING CENTER | Age: 70
End: 2025-09-20
Payer: MEDICARE

## 2025-09-20 PROCEDURE — 78072 PARATHYRD PLANAR W/SPECT&CT: CPT | Mod: 26

## (undated) DEVICE — DRAPE INSTRUMENT POUCH 6.75" X 11"

## (undated) DEVICE — DRSG MAMMARY SUPPORT XL SIZE 5

## (undated) DEVICE — PACK MINOR NO DRAPE

## (undated) DEVICE — GOWN LG

## (undated) DEVICE — SOL IRR POUR H2O 500ML

## (undated) DEVICE — DRSG TEGADERM 4 X 4.75"

## (undated) DEVICE — RADIOGRAPHY DVC SPEC TRANSPEC

## (undated) DEVICE — SUT MONOCRYL 4-0 27" PS-2 UNDYED

## (undated) DEVICE — DRAPE TOWEL BLUE 17" X 24"

## (undated) DEVICE — DRSG MAMMARY SUPPORT MED SIZE 2

## (undated) DEVICE — VENODYNE/SCD SLEEVE CALF MEDIUM

## (undated) DEVICE — WARMING BLANKET LOWER ADULT

## (undated) DEVICE — DRSG MAMMARY SUPPORT SM SIZE 1

## (undated) DEVICE — ELCTR GROUNDING PAD ADULT COVIDIEN

## (undated) DEVICE — POSITIONER PATIENT SAFETY STRAP 3X60"

## (undated) DEVICE — DRAPE CHEST BREAST 106" X 122"

## (undated) DEVICE — GLV 7 PROTEXIS (WHITE)

## (undated) DEVICE — NDL HYPO SAFE 25G X 1" (ORANGE)

## (undated) DEVICE — SUT PLAIN GUT FAST ABSORBING 5-0 PC-1

## (undated) DEVICE — DRSG TELFA 3 X 8

## (undated) DEVICE — SUT MONOCRYL 3-0 18" PS-2 UNDYED

## (undated) DEVICE — SUCTION YANKAUER NO CONTROL VENT

## (undated) DEVICE — DRSG MAMMARY SUPPORT LG SIZE 4

## (undated) DEVICE — ELCTR ROCKER SWITCH PENCIL BLUE 10FT

## (undated) DEVICE — ELCTR BOVIE TIP BLADE INSULATED 4" EDGE

## (undated) DEVICE — DRSG STERISTRIPS 0.5 X 4"

## (undated) DEVICE — DRAPE 3/4 SHEET 52X76"

## (undated) DEVICE — POSITIONER FOAM EGG CRATE ULNAR 2PCS (PINK)

## (undated) DEVICE — LAP PAD W RING 18 X 18"

## (undated) DEVICE — SUT SILK 2-0 18" FS

## (undated) DEVICE — GLV 7.5 PROTEXIS (WHITE)

## (undated) DEVICE — DRSG MAMMARY SUPPORT MED SIZE 3

## (undated) DEVICE — SOL IRR POUR NS 0.9% 500ML